# Patient Record
Sex: FEMALE | Race: WHITE
[De-identification: names, ages, dates, MRNs, and addresses within clinical notes are randomized per-mention and may not be internally consistent; named-entity substitution may affect disease eponyms.]

---

## 2019-08-27 ENCOUNTER — HOSPITAL ENCOUNTER (OUTPATIENT)
Dept: HOSPITAL 97 - OR | Age: 64
Discharge: HOME | End: 2019-08-27
Attending: SPECIALIST
Payer: SELF-PAY

## 2019-08-27 DIAGNOSIS — C44.612: Primary | ICD-10-CM

## 2019-08-27 DIAGNOSIS — Z88.6: ICD-10-CM

## 2019-08-27 LAB
HCT VFR BLD CALC: 41.5 % (ref 36–45)
LYMPHOCYTES # SPEC AUTO: 2.9 K/UL (ref 0.7–4.9)
PMV BLD: 7.9 FL (ref 7.6–11.3)
RBC # BLD: 4.53 M/UL (ref 3.86–4.86)

## 2019-08-27 PROCEDURE — 93005 ELECTROCARDIOGRAM TRACING: CPT

## 2019-08-27 PROCEDURE — 71046 X-RAY EXAM CHEST 2 VIEWS: CPT

## 2019-08-27 PROCEDURE — 88332 PATH CONSLTJ SURG EA ADD BLK: CPT

## 2019-08-27 PROCEDURE — 85025 COMPLETE CBC W/AUTO DIFF WBC: CPT

## 2019-08-27 PROCEDURE — 88331 PATH CONSLTJ SURG 1 BLK 1SPC: CPT

## 2019-08-27 PROCEDURE — 36415 COLL VENOUS BLD VENIPUNCTURE: CPT

## 2019-08-27 PROCEDURE — 88305 TISSUE EXAM BY PATHOLOGIST: CPT

## 2019-08-27 PROCEDURE — 0HBFXZZ EXCISION OF RIGHT HAND SKIN, EXTERNAL APPROACH: ICD-10-PCS

## 2019-08-27 NOTE — RAD REPORT
EXAM DESCRIPTION:  RAD - Chest Pa And Lat (2 Views) - 8/27/2019 9:56 am

 

CLINICAL HISTORY:  preop

Chest pain.

 

COMPARISON:  No comparisons

 

FINDINGS:  Several calcified granulomas are present left lung. The lungs are grossly clear. The heart
 is normal in size. No displaced fractures.

## 2019-08-27 NOTE — EKG
Test Date:    2019-08-27               Test Time:    09:43:20

Technician:   VIJAYA                                    

                                                     

MEASUREMENT RESULTS:                                       

Intervals:                                           

Rate:         64                                     

TX:           134                                    

QRSD:         80                                     

QT:           396                                    

QTc:          408                                    

Axis:                                                

P:            69                                     

TX:           134                                    

QRS:          63                                     

T:            44                                     

                                                     

INTERPRETIVE STATEMENTS:                                       

                                                     

Normal sinus rhythm

Normal ECG

Compared to ECG 07/06/2008 19:45:34

Sinus bradycardia no longer present



Electronically Signed On 08-27-19 11:18:37 CDT by Mushtaq Ramirez

## 2019-08-27 NOTE — RAD REPORT
EXAM DESCRIPTION:  RAD - Hand Right 2 View - 8/27/2019 9:56 am

 

CLINICAL HISTORY:  preop

Hand pain and swelling

 

COMPARISON:  No comparisons

 

FINDINGS:  Prominent degenerative changes present at the first carpometacarpal joint. Soft tissue swe
lling is seen along the dorsum of the hand. No fracture or dislocation.

## 2019-08-27 NOTE — OP
Surgeon:  Leonard Torres MD



Preoperative Diagnosis:  Basal carcinoma of the right hand.



Postoperative Diagnosis:  Basal carcinoma of the right hand.



Procedure Performed:  Excision of basal cell carcinoma of the right hand with flap advancement.



Anesthesia:  General.



Description Of Procedure:  After satisfactory induction of general anesthesia, the hand was prepped w
ith Betadine scrub, Betadine paint, dry sterile drapes placed in the usual manner.  The arm was eleva
niyah, exsanguinated with an Esmarch, tourniquet inflated to 250 mmHg.  Elliptical incision was made ar
ound the mass of the dorsal hand over the third metacarpal, extended proximally and distally.  After 
it was excised, there was questionable area distally and additional crescent excision was performed a
nd sent for frozen as well.  Frozen section revealed basal cell carcinoma.  Lines of resection were c
lear.  The wound was closed by undermining the skin and advancing the flaps, closed this with4-0 prol
barry simple sutures.  Dressed with Xeroform, 2 inch William, Kerlix, and splint holding the wrist in 10 
degrees of dorsiflexion.  The patient tolerated the procedure well and returned to recovery.





RIKKI/SUE

DD:  08/27/2019 11:42:00Voice ID:  [id number]

DT:  08/27/2019 23:21:51Report ID:  [id number]